# Patient Record
Sex: FEMALE | Race: WHITE | NOT HISPANIC OR LATINO | URBAN - METROPOLITAN AREA
[De-identification: names, ages, dates, MRNs, and addresses within clinical notes are randomized per-mention and may not be internally consistent; named-entity substitution may affect disease eponyms.]

---

## 2019-01-16 ENCOUNTER — APPOINTMENT (RX ONLY)
Dept: URBAN - METROPOLITAN AREA CLINIC 51 | Facility: CLINIC | Age: 74
Setting detail: DERMATOLOGY
End: 2019-01-16

## 2019-01-16 ENCOUNTER — APPOINTMENT (RX ONLY)
Dept: URBAN - METROPOLITAN AREA CLINIC 52 | Facility: CLINIC | Age: 74
Setting detail: DERMATOLOGY
End: 2019-01-16

## 2019-01-16 DIAGNOSIS — L29.8 OTHER PRURITUS: ICD-10-CM

## 2019-01-16 DIAGNOSIS — L27.0 GENERALIZED SKIN ERUPTION DUE TO DRUGS AND MEDICAMENTS TAKEN INTERNALLY: ICD-10-CM

## 2019-01-16 DIAGNOSIS — L29.89 OTHER PRURITUS: ICD-10-CM

## 2019-01-16 PROBLEM — I10 ESSENTIAL (PRIMARY) HYPERTENSION: Status: ACTIVE | Noted: 2019-01-16

## 2019-01-16 PROBLEM — E03.9 HYPOTHYROIDISM, UNSPECIFIED: Status: ACTIVE | Noted: 2019-01-16

## 2019-01-16 PROCEDURE — ? PRESCRIPTION

## 2019-01-16 PROCEDURE — ? COUNSELING

## 2019-01-16 PROCEDURE — 99202 OFFICE O/P NEW SF 15 MIN: CPT

## 2019-01-16 RX ORDER — PREDNISONE 10 MG/1
TABLET ORAL
Qty: 40 | Refills: 0 | Status: ERX | COMMUNITY
Start: 2019-01-16

## 2019-01-16 RX ORDER — TRIAMCINOLONE ACETONIDE 1 MG/G
CREAM TOPICAL BID
Qty: 1 | Refills: 1 | Status: ERX | COMMUNITY
Start: 2019-01-16

## 2019-01-16 RX ADMIN — TRIAMCINOLONE ACETONIDE: 1 CREAM TOPICAL at 13:49

## 2019-01-16 RX ADMIN — PREDNISONE: 10 TABLET ORAL at 13:49

## 2019-01-16 ASSESSMENT — SEVERITY ASSESSMENT
SEVERITY: MILD TO MODERATE
SEVERITY: MILD TO MODERATE

## 2019-01-16 ASSESSMENT — LOCATION SIMPLE DESCRIPTION DERM
LOCATION SIMPLE: RIGHT ANTERIOR NECK
LOCATION SIMPLE: POSTERIOR NECK
LOCATION SIMPLE: POSTERIOR NECK
LOCATION SIMPLE: RIGHT UPPER BACK
LOCATION SIMPLE: RIGHT FOREHEAD
LOCATION SIMPLE: RIGHT FOREHEAD
LOCATION SIMPLE: CHEST
LOCATION SIMPLE: RIGHT UPPER BACK
LOCATION SIMPLE: RIGHT ANTERIOR NECK
LOCATION SIMPLE: CHEST

## 2019-01-16 ASSESSMENT — LOCATION DETAILED DESCRIPTION DERM
LOCATION DETAILED: UPPER STERNUM
LOCATION DETAILED: MID POSTERIOR NECK
LOCATION DETAILED: RIGHT INFERIOR ANTERIOR NECK
LOCATION DETAILED: UPPER STERNUM
LOCATION DETAILED: RIGHT MEDIAL FOREHEAD
LOCATION DETAILED: MID POSTERIOR NECK
LOCATION DETAILED: RIGHT SUPERIOR MEDIAL UPPER BACK
LOCATION DETAILED: RIGHT INFERIOR ANTERIOR NECK
LOCATION DETAILED: RIGHT MEDIAL FOREHEAD
LOCATION DETAILED: RIGHT SUPERIOR MEDIAL UPPER BACK

## 2019-01-16 ASSESSMENT — ITCH INTENSITY
HOW SEVERE IS YOUR ITCHING?: 4
HOW SEVERE IS YOUR ITCHING?: 4

## 2019-01-16 ASSESSMENT — LOCATION ZONE DERM
LOCATION ZONE: FACE
LOCATION ZONE: NECK
LOCATION ZONE: NECK
LOCATION ZONE: TRUNK
LOCATION ZONE: TRUNK
LOCATION ZONE: FACE

## 2019-01-16 NOTE — HPI: RASH
What Type Of Note Output Would You Prefer (Optional)?: Standard Output
How Severe Is Your Rash?: moderate
Is This A New Presentation, Or A Follow-Up?: Rash
Additional History: She originally had a rash under breast and given ketoconazole 200 mg qd and after 5 days she broke out in scalp, neck, chest shoulders very itchy. She went back and taken off ketoconazole and given a steroid shot, prednisone pills and a steroid cream which have helped a little. For the rash under her breast she was given clotrimazole and betamethasone cream which has cleared up the original rash but still very itchy.

## 2023-01-31 ENCOUNTER — WEB ENCOUNTER (OUTPATIENT)
Dept: URBAN - METROPOLITAN AREA CLINIC 70 | Facility: CLINIC | Age: 78
End: 2023-01-31

## 2023-01-31 ENCOUNTER — OFFICE VISIT (OUTPATIENT)
Dept: URBAN - METROPOLITAN AREA CLINIC 70 | Facility: CLINIC | Age: 78
End: 2023-01-31
Payer: MEDICARE

## 2023-01-31 VITALS
HEART RATE: 91 BPM | HEIGHT: 64 IN | DIASTOLIC BLOOD PRESSURE: 74 MMHG | BODY MASS INDEX: 45.65 KG/M2 | SYSTOLIC BLOOD PRESSURE: 116 MMHG | WEIGHT: 267.4 LBS | TEMPERATURE: 97.6 F

## 2023-01-31 DIAGNOSIS — R19.7 ACUTE DIARRHEA: ICD-10-CM

## 2023-01-31 DIAGNOSIS — Z12.11 COLON CANCER SCREENING: ICD-10-CM

## 2023-01-31 PROCEDURE — 99203 OFFICE O/P NEW LOW 30 MIN: CPT

## 2023-01-31 RX ORDER — SOLIFENACIN SUCCINATE 10 MG/1
TAKE 1 TABLET (10 MG) BY ORAL ROUTE ONCE DAILY TABLET, FILM COATED ORAL 1
Qty: 0 | Refills: 0 | Status: ACTIVE | COMMUNITY
Start: 1900-01-01

## 2023-01-31 RX ORDER — CEPHALEXIN 500 MG/1
1 TABLET CAPSULE ORAL ONCE A DAY
Status: ACTIVE | COMMUNITY

## 2023-01-31 RX ORDER — SEMAGLUTIDE 1.34 MG/ML
AS DIRECTED INJECTION, SOLUTION SUBCUTANEOUS
Status: ACTIVE | COMMUNITY

## 2023-01-31 RX ORDER — SIMVASTATIN 10 MG/1
TAKE 1 TABLET (10 MG) BY ORAL ROUTE ONCE DAILY IN THE EVENING TABLET, FILM COATED ORAL 1
Qty: 0 | Refills: 0 | Status: ACTIVE | COMMUNITY
Start: 1900-01-01

## 2023-01-31 RX ORDER — LISINOPRIL 10 MG/1
TAKE 1 TABLET (10 MG) BY ORAL ROUTE ONCE DAILY TABLET ORAL 1
Qty: 0 | Refills: 0 | Status: ACTIVE | COMMUNITY
Start: 1900-01-01

## 2023-01-31 RX ORDER — UBIDECARENONE 200 MG
TAKE 1 CAPSULE BY ORAL ROUTE DAILY CAPSULE ORAL 1
Qty: 0 | Refills: 0 | Status: ACTIVE | COMMUNITY
Start: 1900-01-01

## 2023-01-31 NOTE — HPI-TODAY'S VISIT:
The pt presents for a hospital f/u.  She states 2 weeks ago she experienced diarrhea, weakness, loss of appetite, and an episode of rectal bleeding.  She states that she went to the [place] ED and labs were normal.  She was treated with abx for acute colitis. She denies sick contacts, new medications, or changes in diet around the time that symptoms started.  Today she states symptoms have completely resolved.  She denies rectal bleeding, diarrhea, abdominal pain, or weight loss.  Last colonoscopy was in 2018 showing a hyperplastic polyp in the sigmoid colon and diverticula in the sigmoid colon.  Recall 10 years.

## 2023-01-31 NOTE — PHYSICAL EXAM HENT:
Head,  normocephalic,  atraumatic,  Face,  Face within normal limits,  Ears,  External ears within normal limits,  Nose/Nasopharynx,  External nose  normal appearance, Mouth and Throat,  Breath odor normal,  Lips,  Appearance normal Work

## 2023-11-15 ENCOUNTER — P2P PATIENT RECORD (OUTPATIENT)
Age: 78
End: 2023-11-15

## 2023-12-20 ENCOUNTER — OFFICE VISIT (OUTPATIENT)
Dept: URBAN - METROPOLITAN AREA CLINIC 70 | Facility: CLINIC | Age: 78
End: 2023-12-20

## 2024-01-08 ENCOUNTER — DASHBOARD ENCOUNTERS (OUTPATIENT)
Age: 79
End: 2024-01-08

## 2024-01-10 ENCOUNTER — OFFICE VISIT (OUTPATIENT)
Dept: URBAN - METROPOLITAN AREA CLINIC 70 | Facility: CLINIC | Age: 79
End: 2024-01-10

## 2024-01-10 VITALS
TEMPERATURE: 97.5 F | HEART RATE: 83 BPM | HEIGHT: 63 IN | SYSTOLIC BLOOD PRESSURE: 127 MMHG | BODY MASS INDEX: 49.29 KG/M2 | DIASTOLIC BLOOD PRESSURE: 78 MMHG | WEIGHT: 278.2 LBS

## 2024-01-10 RX ORDER — LISINOPRIL 10 MG/1
TAKE 1 TABLET (10 MG) BY ORAL ROUTE ONCE DAILY TABLET ORAL 1
Qty: 0 | Refills: 0 | Status: ACTIVE | COMMUNITY
Start: 1900-01-01

## 2024-01-10 RX ORDER — SEMAGLUTIDE 1.34 MG/ML
AS DIRECTED INJECTION, SOLUTION SUBCUTANEOUS
Status: ON HOLD | COMMUNITY

## 2024-01-10 RX ORDER — SOLIFENACIN SUCCINATE 10 MG/1
TAKE 1 TABLET (10 MG) BY ORAL ROUTE ONCE DAILY TABLET, FILM COATED ORAL 1
Qty: 0 | Refills: 0 | Status: ACTIVE | COMMUNITY
Start: 1900-01-01

## 2024-01-10 RX ORDER — SIMVASTATIN 10 MG/1
TAKE 1 TABLET (10 MG) BY ORAL ROUTE ONCE DAILY IN THE EVENING TABLET, FILM COATED ORAL 1
Qty: 0 | Refills: 0 | Status: ACTIVE | COMMUNITY
Start: 1900-01-01

## 2024-01-10 RX ORDER — UBIDECARENONE 200 MG
TAKE 1 CAPSULE BY ORAL ROUTE DAILY CAPSULE ORAL 1
Qty: 0 | Refills: 0 | Status: ACTIVE | COMMUNITY
Start: 1900-01-01

## 2024-01-10 RX ORDER — CEPHALEXIN 500 MG/1
1 TABLET CAPSULE ORAL ONCE A DAY
Status: ACTIVE | COMMUNITY